# Patient Record
Sex: MALE | Race: WHITE | NOT HISPANIC OR LATINO | URBAN - METROPOLITAN AREA
[De-identification: names, ages, dates, MRNs, and addresses within clinical notes are randomized per-mention and may not be internally consistent; named-entity substitution may affect disease eponyms.]

---

## 2021-09-09 ENCOUNTER — OFFICE VISIT (OUTPATIENT)
Dept: URBAN - METROPOLITAN AREA CLINIC 70 | Facility: CLINIC | Age: 46
End: 2021-09-09
Payer: COMMERCIAL

## 2021-09-09 ENCOUNTER — LAB OUTSIDE AN ENCOUNTER (OUTPATIENT)
Dept: URBAN - METROPOLITAN AREA CLINIC 70 | Facility: CLINIC | Age: 46
End: 2021-09-09

## 2021-09-09 ENCOUNTER — WEB ENCOUNTER (OUTPATIENT)
Dept: URBAN - METROPOLITAN AREA CLINIC 70 | Facility: CLINIC | Age: 46
End: 2021-09-09

## 2021-09-09 DIAGNOSIS — Z12.11 COLON CANCER SCREENING: ICD-10-CM

## 2021-09-09 DIAGNOSIS — Z87.19 HISTORY OF DIVERTICULITIS: ICD-10-CM

## 2021-09-09 PROCEDURE — 99203 OFFICE O/P NEW LOW 30 MIN: CPT | Performed by: INTERNAL MEDICINE

## 2021-09-09 RX ORDER — AMOXICILLIN AND CLAVULANATE POTASSIUM 562.5; 437.5; 62.5 MG/1; MG/1; MG/1
TABLET, MULTILAYER, EXTENDED RELEASE ORAL
Qty: 28 | Status: ACTIVE | COMMUNITY

## 2021-09-09 NOTE — HPI-TODAY'S VISIT:
Pt presents for colon cancer screening. No prior colonoscopy. No family history of colon cancer. Pt denies any diarrhea, constipation, rectal bleeding or weight loss. He reports having some lower abdominal pain and fever a week ago. He was treated empirically for diverticulitis by his father who is a retired gastroenterologist. The pain has completely resolved.

## 2021-09-23 ENCOUNTER — OFFICE VISIT (OUTPATIENT)
Dept: URBAN - METROPOLITAN AREA SURGERY CENTER 24 | Facility: SURGERY CENTER | Age: 46
End: 2021-09-23
Payer: COMMERCIAL

## 2021-09-23 ENCOUNTER — CLAIMS CREATED FROM THE CLAIM WINDOW (OUTPATIENT)
Dept: URBAN - METROPOLITAN AREA CLINIC 4 | Facility: CLINIC | Age: 46
End: 2021-09-23
Payer: COMMERCIAL

## 2021-09-23 DIAGNOSIS — K63.89 POLYP, HYPERPLASTIC: ICD-10-CM

## 2021-09-23 DIAGNOSIS — K63.5 BENIGN COLON POLYP: ICD-10-CM

## 2021-09-23 PROCEDURE — G8907 PT DOC NO EVENTS ON DISCHARG: HCPCS | Performed by: INTERNAL MEDICINE

## 2021-09-23 PROCEDURE — 88305 TISSUE EXAM BY PATHOLOGIST: CPT | Performed by: PATHOLOGY

## 2021-09-23 PROCEDURE — 45380 COLONOSCOPY AND BIOPSY: CPT | Performed by: INTERNAL MEDICINE

## 2021-09-23 RX ORDER — AMOXICILLIN AND CLAVULANATE POTASSIUM 562.5; 437.5; 62.5 MG/1; MG/1; MG/1
TABLET, MULTILAYER, EXTENDED RELEASE ORAL
Qty: 28 | Status: ACTIVE | COMMUNITY

## 2021-11-01 ENCOUNTER — OFFICE VISIT (OUTPATIENT)
Dept: URBAN - METROPOLITAN AREA CLINIC 70 | Facility: CLINIC | Age: 46
End: 2021-11-01
Payer: COMMERCIAL

## 2021-11-01 DIAGNOSIS — K57.90 DIVERTICULOSIS: ICD-10-CM

## 2021-11-01 DIAGNOSIS — R10.13 DYSPEPSIA: ICD-10-CM

## 2021-11-01 PROBLEM — 397881000: Status: ACTIVE | Noted: 2021-11-01

## 2021-11-01 PROCEDURE — 99214 OFFICE O/P EST MOD 30 MIN: CPT | Performed by: REGISTERED NURSE

## 2021-11-01 RX ORDER — AMOXICILLIN AND CLAVULANATE POTASSIUM 562.5; 437.5; 62.5 MG/1; MG/1; MG/1
TABLET, MULTILAYER, EXTENDED RELEASE ORAL
Qty: 28 | Status: DISCONTINUED | COMMUNITY

## 2021-11-01 RX ORDER — LANSOPRAZOLE 30 MG
1 CAPSULE BEFORE A MEAL CAPSULE,DELAYED RELEASE (ENTERIC COATED) ORAL ONCE A DAY
OUTPATIENT

## 2021-11-01 RX ORDER — LANSOPRAZOLE 30 MG
1 CAPSULE BEFORE A MEAL CAPSULE,DELAYED RELEASE (ENTERIC COATED) ORAL ONCE A DAY
Status: ACTIVE | COMMUNITY

## 2021-11-01 NOTE — HPI-TODAY'S VISIT:
Note from OV 9/9/21: Pt presents for colon cancer screening. No prior colonoscopy. No family history of colon cancer. Pt denies any diarrhea, constipation, rectal bleeding or weight loss. He reports having some lower abdominal pain and fever a week ago. He was treated empirically for diverticulitis by his father who is a retired gastroenterologist. The pain has completely resolved. -------------------------------- TODAY: Colonoscopy on 9/23/21 showed diverticulosis and a focal area of mild erythema in the sigmoid colon consistent with recent diverticulitis. Biopsies were benign. He denies any abdominal pain. He does report some intermittent dyspepsia after meals. Reports mainly an feeling of pressure in the epigastrum after eating. He started on Prevacid today at the advise of his father(MD).

## 2022-03-31 ENCOUNTER — OFFICE VISIT (OUTPATIENT)
Dept: URBAN - METROPOLITAN AREA CLINIC 70 | Facility: CLINIC | Age: 47
End: 2022-03-31
Payer: COMMERCIAL

## 2022-03-31 ENCOUNTER — DASHBOARD ENCOUNTERS (OUTPATIENT)
Age: 47
End: 2022-03-31

## 2022-03-31 ENCOUNTER — LAB OUTSIDE AN ENCOUNTER (OUTPATIENT)
Dept: URBAN - METROPOLITAN AREA CLINIC 70 | Facility: CLINIC | Age: 47
End: 2022-03-31

## 2022-03-31 VITALS
WEIGHT: 172.04 LBS | HEIGHT: 71 IN | DIASTOLIC BLOOD PRESSURE: 95 MMHG | HEART RATE: 55 BPM | TEMPERATURE: 97.2 F | SYSTOLIC BLOOD PRESSURE: 141 MMHG | BODY MASS INDEX: 24.09 KG/M2

## 2022-03-31 DIAGNOSIS — K21.9 GASTROESOPHAGEAL REFLUX DISEASE, UNSPECIFIED WHETHER ESOPHAGITIS PRESENT: ICD-10-CM

## 2022-03-31 PROBLEM — 235595009: Status: ACTIVE | Noted: 2022-03-31

## 2022-03-31 PROCEDURE — 99214 OFFICE O/P EST MOD 30 MIN: CPT | Performed by: REGISTERED NURSE

## 2022-03-31 RX ORDER — LANSOPRAZOLE 30 MG
1 CAPSULE BEFORE A MEAL CAPSULE,DELAYED RELEASE (ENTERIC COATED) ORAL ONCE A DAY
Status: DISCONTINUED | COMMUNITY

## 2022-03-31 RX ORDER — PANTOPRAZOLE SODIUM 40 MG/1
1 TABLET TABLET, DELAYED RELEASE ORAL ONCE A DAY
Qty: 90 TABLET | Refills: 3 | OUTPATIENT
Start: 2022-03-31

## 2022-03-31 NOTE — HPI-TODAY'S VISIT:
Pt presents with c/o GERD. Symptoms include heartburn and indigestion after eating. Symptoms have been present for 5 months. Past treatment has been over the counter Prevacid with minimal improvement. Symptoms are triggered by eating anything. Symptoms are relieved by nothing in particular. Never had an EGD.  Colonoscopy 9/23/21 showed diverticulosis.

## 2022-04-11 ENCOUNTER — OFFICE VISIT (OUTPATIENT)
Dept: URBAN - METROPOLITAN AREA SURGERY CENTER 24 | Facility: SURGERY CENTER | Age: 47
End: 2022-04-11
Payer: COMMERCIAL

## 2022-04-11 ENCOUNTER — CLAIMS CREATED FROM THE CLAIM WINDOW (OUTPATIENT)
Dept: URBAN - METROPOLITAN AREA CLINIC 4 | Facility: CLINIC | Age: 47
End: 2022-04-11
Payer: COMMERCIAL

## 2022-04-11 DIAGNOSIS — K29.70 GASTRITIS, UNSPECIFIED, WITHOUT BLEEDING: ICD-10-CM

## 2022-04-11 DIAGNOSIS — Z12.11 COLON CANCER SCREENING: ICD-10-CM

## 2022-04-11 DIAGNOSIS — K22.8 OTHER SPECIFIED DISEASES OF ESOPHAGUS: ICD-10-CM

## 2022-04-11 PROCEDURE — 45378 DIAGNOSTIC COLONOSCOPY: CPT | Performed by: INTERNAL MEDICINE

## 2022-04-11 PROCEDURE — G8907 PT DOC NO EVENTS ON DISCHARG: HCPCS | Performed by: INTERNAL MEDICINE

## 2022-04-11 PROCEDURE — 88312 SPECIAL STAINS GROUP 1: CPT | Performed by: PATHOLOGY

## 2022-04-11 PROCEDURE — 88305 TISSUE EXAM BY PATHOLOGIST: CPT | Performed by: PATHOLOGY

## 2022-04-11 RX ORDER — PANTOPRAZOLE SODIUM 40 MG/1
1 TABLET TABLET, DELAYED RELEASE ORAL ONCE A DAY
Qty: 90 TABLET | Refills: 3 | Status: ACTIVE | COMMUNITY
Start: 2022-03-31